# Patient Record
Sex: FEMALE | Race: WHITE | NOT HISPANIC OR LATINO | URBAN - METROPOLITAN AREA
[De-identification: names, ages, dates, MRNs, and addresses within clinical notes are randomized per-mention and may not be internally consistent; named-entity substitution may affect disease eponyms.]

---

## 2021-09-03 ENCOUNTER — PREPPED CHART (OUTPATIENT)
Dept: URBAN - METROPOLITAN AREA CLINIC 53 | Facility: CLINIC | Age: 61
End: 2021-09-03

## 2021-11-02 ENCOUNTER — NEW PATIENT COMPREHENSIVE (OUTPATIENT)
Dept: URBAN - METROPOLITAN AREA CLINIC 53 | Facility: CLINIC | Age: 61
End: 2021-11-02

## 2021-11-02 DIAGNOSIS — H25.13: ICD-10-CM

## 2021-11-02 DIAGNOSIS — H43.813: ICD-10-CM

## 2021-11-02 PROCEDURE — 92004 COMPRE OPH EXAM NEW PT 1/>: CPT

## 2021-11-02 ASSESSMENT — TONOMETRY
OS_IOP_MMHG: 14
OD_IOP_MMHG: 15

## 2021-11-02 ASSESSMENT — VISUAL ACUITY
OS_CC: 20/20
OD_CC: 20/20
OS_CC: J1+
OD_CC: J1+

## 2022-07-12 ENCOUNTER — CONTACT LENSES/GLASSES VISIT (OUTPATIENT)
Dept: URBAN - METROPOLITAN AREA CLINIC 53 | Facility: CLINIC | Age: 62
End: 2022-07-12

## 2022-07-12 DIAGNOSIS — H52.4: ICD-10-CM

## 2022-07-12 PROCEDURE — 92015 DETERMINE REFRACTIVE STATE: CPT

## 2022-07-12 ASSESSMENT — VISUAL ACUITY
OU_CC: 20/20
OD_CC: 20/20-1
OS_CC: 20/20-1
OU_CC: J1+

## 2022-07-12 NOTE — PATIENT DISCUSSION
Patient preferred a weaker Rx for OS today versus what she came in wearing. I consulted with Dr. Kassandra Rodriguez as I believe dryness is playing a factor with today's exam. Dr. Kassandra Rodriguez agreed and advised to finalize what she's wearing today as she is 20/20 J1+ and have her do ATs and if the new glasses are not working for patient still, she can schedule a Refraction Recheck. I relayed all of this info to patient and patient understood and is very appreciative.

## 2022-11-08 ENCOUNTER — COMPREHENSIVE EXAM (OUTPATIENT)
Dept: URBAN - METROPOLITAN AREA CLINIC 53 | Facility: CLINIC | Age: 62
End: 2022-11-08

## 2022-11-08 DIAGNOSIS — H25.13: ICD-10-CM

## 2022-11-08 DIAGNOSIS — H43.813: ICD-10-CM

## 2022-11-08 PROCEDURE — 92014 COMPRE OPH EXAM EST PT 1/>: CPT

## 2022-11-08 ASSESSMENT — VISUAL ACUITY
OS_CC: 20/20
OU_CC: J1+
OU_CC: 20/20
OD_CC: 20/20

## 2022-11-08 ASSESSMENT — TONOMETRY
OD_IOP_MMHG: 14
OS_IOP_MMHG: 15

## 2022-11-08 NOTE — PATIENT DISCUSSION
Patient preferred a weaker Rx for OS today versus what she came in wearing. I consulted with Dr. Nu Cheung as I believe dryness is playing a factor with today's exam. Dr. Nu Cheung agreed and advised to finalize what she's wearing today as she is 20/20 J1+ and have her do ATs and if the new glasses are not working for patient still, she can schedule a Refraction Recheck. I relayed all of this info to patient and patient understood and is very appreciative.

## 2023-11-14 ENCOUNTER — COMPREHENSIVE EXAM (OUTPATIENT)
Dept: URBAN - METROPOLITAN AREA CLINIC 53 | Facility: CLINIC | Age: 63
End: 2023-11-14

## 2023-11-14 DIAGNOSIS — H25.13: ICD-10-CM

## 2023-11-14 DIAGNOSIS — H52.4: ICD-10-CM

## 2023-11-14 DIAGNOSIS — H04.123: ICD-10-CM

## 2023-11-14 PROCEDURE — 92015 DETERMINE REFRACTIVE STATE: CPT

## 2023-11-14 PROCEDURE — 99214 OFFICE O/P EST MOD 30 MIN: CPT

## 2023-11-14 ASSESSMENT — VISUAL ACUITY
OD_GLARE: 20/25
OS_GLARE: 20/20
OD_GLARE: 20/25
OS_GLARE: 20/20
OS_CC: 20/20-2
OD_CC: 20/25
OU_CC: J1+@16

## 2023-11-14 ASSESSMENT — TONOMETRY
OS_IOP_MMHG: 14
OD_IOP_MMHG: 14

## 2024-11-20 ENCOUNTER — COMPREHENSIVE EXAM (OUTPATIENT)
Dept: URBAN - METROPOLITAN AREA CLINIC 49 | Facility: LOCATION | Age: 64
End: 2024-11-20

## 2024-11-20 DIAGNOSIS — H04.123: ICD-10-CM

## 2024-11-20 DIAGNOSIS — H43.393: ICD-10-CM

## 2024-11-20 DIAGNOSIS — H25.13: ICD-10-CM

## 2024-11-20 DIAGNOSIS — H52.4: ICD-10-CM

## 2024-11-20 PROCEDURE — 92015 DETERMINE REFRACTIVE STATE: CPT

## 2024-11-20 PROCEDURE — 99214 OFFICE O/P EST MOD 30 MIN: CPT

## 2025-05-12 ENCOUNTER — PRE-OP/H&P (OUTPATIENT)
Age: 65
End: 2025-05-12

## 2025-05-12 DIAGNOSIS — H43.393: ICD-10-CM

## 2025-05-12 DIAGNOSIS — H25.13: ICD-10-CM

## 2025-05-12 PROCEDURE — 92136 OPHTHALMIC BIOMETRY: CPT

## 2025-05-12 PROCEDURE — PREOP PRE OP VISIT

## 2025-05-12 PROCEDURE — 92134 CPTRZ OPH DX IMG PST SGM RTA: CPT

## 2025-05-12 PROCEDURE — 92025IOL CORNEAL TOPOGRAPHY PREMIUM IOL

## 2025-05-29 ENCOUNTER — SURGERY/PROCEDURE (OUTPATIENT)
Age: 65
End: 2025-05-29

## 2025-05-29 ENCOUNTER — POST-OP (OUTPATIENT)
Age: 65
End: 2025-05-29

## 2025-05-29 DIAGNOSIS — Z96.1: ICD-10-CM

## 2025-05-29 DIAGNOSIS — H57.03: ICD-10-CM

## 2025-05-29 DIAGNOSIS — Z98.41: ICD-10-CM

## 2025-05-29 DIAGNOSIS — H25.11: ICD-10-CM

## 2025-05-29 PROCEDURE — 66984 XCAPSL CTRC RMVL W/O ECP: CPT

## 2025-06-06 ENCOUNTER — POST-OP (OUTPATIENT)
Age: 65
End: 2025-06-06

## 2025-06-06 DIAGNOSIS — Z96.1: ICD-10-CM

## 2025-06-06 DIAGNOSIS — H25.12: ICD-10-CM

## 2025-06-06 DIAGNOSIS — Z98.41: ICD-10-CM

## 2025-06-19 ENCOUNTER — POST-OP (OUTPATIENT)
Age: 65
End: 2025-06-19

## 2025-06-19 ENCOUNTER — SURGERY/PROCEDURE (OUTPATIENT)
Age: 65
End: 2025-06-19

## 2025-06-19 DIAGNOSIS — H25.12: ICD-10-CM

## 2025-06-19 DIAGNOSIS — Z98.42: ICD-10-CM

## 2025-06-19 DIAGNOSIS — Z96.1: ICD-10-CM

## 2025-06-19 PROCEDURE — 66984 XCAPSL CTRC RMVL W/O ECP: CPT | Mod: 79,LT

## 2025-06-23 ENCOUNTER — POST-OP (OUTPATIENT)
Age: 65
End: 2025-06-23

## 2025-06-23 DIAGNOSIS — Z98.42: ICD-10-CM

## 2025-06-23 DIAGNOSIS — H04.123: ICD-10-CM

## 2025-06-23 DIAGNOSIS — Z96.1: ICD-10-CM

## 2025-06-23 PROCEDURE — 99024 POSTOP FOLLOW-UP VISIT: CPT

## 2025-07-11 ENCOUNTER — POST-OP (OUTPATIENT)
Age: 65
End: 2025-07-11

## 2025-07-11 DIAGNOSIS — Z96.1: ICD-10-CM

## 2025-07-11 DIAGNOSIS — Z98.42: ICD-10-CM

## 2025-07-11 DIAGNOSIS — Z98.41: ICD-10-CM

## 2025-07-11 PROCEDURE — 92134 CPTRZ OPH DX IMG PST SGM RTA: CPT | Mod: NC

## 2025-07-18 ENCOUNTER — CONTACT LENSES/GLASSES VISIT (OUTPATIENT)
Age: 65
End: 2025-07-18

## 2025-07-18 DIAGNOSIS — H52.4: ICD-10-CM

## 2025-07-18 PROCEDURE — 92015 DETERMINE REFRACTIVE STATE: CPT | Mod: NC
